# Patient Record
Sex: MALE | Race: OTHER | NOT HISPANIC OR LATINO | URBAN - METROPOLITAN AREA
[De-identification: names, ages, dates, MRNs, and addresses within clinical notes are randomized per-mention and may not be internally consistent; named-entity substitution may affect disease eponyms.]

---

## 2019-02-27 ENCOUNTER — EMERGENCY (EMERGENCY)
Facility: HOSPITAL | Age: 59
LOS: 0 days | Discharge: HOME | End: 2019-02-27
Attending: EMERGENCY MEDICINE | Admitting: EMERGENCY MEDICINE

## 2019-02-27 VITALS
DIASTOLIC BLOOD PRESSURE: 75 MMHG | TEMPERATURE: 97 F | HEART RATE: 76 BPM | RESPIRATION RATE: 18 BRPM | SYSTOLIC BLOOD PRESSURE: 127 MMHG | OXYGEN SATURATION: 98 % | HEIGHT: 68 IN | WEIGHT: 164.91 LBS
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
## 2019-02-27 DIAGNOSIS — S89.92XA UNSPECIFIED INJURY OF LEFT LOWER LEG, INITIAL ENCOUNTER: ICD-10-CM

## 2019-02-27 DIAGNOSIS — M25.562 PAIN IN LEFT KNEE: ICD-10-CM
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
## 2019-02-27 NOTE — ED PROVIDER NOTE - CLINICAL SUMMARY MEDICAL DECISION MAKING FREE TEXT BOX
59 yo male who tripped and fell this am while walking to bathroom in home in dark, landing on left knee. NO gross joint deformity. Xray did not show any acute fx or dislocation. Will refer to ortho as outpatient and place in knee brace

## 2019-02-27 NOTE — ED PROVIDER NOTE - CARE PROVIDER_API CALL
Anjum Thompson (MD)  Orthopaedic Surgery  [address] [place] [address]  Phone: [phone]  Fax: [phone]  Follow Up Time:

## 2019-02-27 NOTE — ED PROVIDER NOTE - ATTENDING CONTRIBUTION TO CARE
59 yo male presents to ER for left knee pain s/o fall PTA. States he was going to bathroom and tripped (as room dark) and fell, landing on his knee. Denies any LOC, no HA, no use of blood thinners, no CP/SOB/LOC/head injury/neck or back pain/abdomen pain/numbness or weakness. Pain to knee worse with ambulation. +tenderness with palpation to the knee over the medial aspect of left knee. No large joint effusion on visual inspection. Neuro-vascular exam in tact. NO gross joint deformity. Will check xray and place in knee immobilizer/brace and refer to ortho

## 2019-02-27 NOTE — ED PROVIDER NOTE - PHYSICAL EXAMINATION
CONSTITUTIONAL: Well-appearing; well-nourished; in no apparent distress.   CARDIOVASCULAR: Normal S1, S2; no murmurs, rubs, or gallops.   RESPIRATORY: Normal chest excursion with respiration; breath sounds clear and equal bilaterally; no wheezes, rhonchi, or rales.  MS: + tenderness to medial aspect of left knee. neg joint effusion. knee stable. no laxity. + pain to medial stress test. left ankle/foot/hip non-tender. N/V intact. ambulate with antalgic gait. no midline tenderness to neck and back.   SKIN: Normal for age and race; warm; dry; good turgor; no apparent lesions or exudate.   NEURO/PSYCH: A & O x 4; grossly unremarkable.

## 2019-02-27 NOTE — ED PROVIDER NOTE - NSFOLLOWUPINSTRUCTIONS_ED_ALL_ED_FT
Knee Sprain  K  nee sprain is a stretch or tear in a knee ligament. Knee ligaments are bands of tissue that connect bones in the knee to each other.    Follow these instructions at home:  If you have a splint or brace:     Wear the splint or brace as told by your doctor. Remove it only as told by your doctor.  Loosen the splint or brace if your toes tingle, get numb, or turn cold and blue.  Keep the splint or brace clean.  If the splint or brace is not waterproof:    Do not let it get wet.  Cover it with a watertight covering when you take a bath or a shower.    If you have a cast:     Do not stick anything inside the cast to scratch your skin.  Check the skin around the cast every day. Tell your doctor about any concerns.  You may put lotion on dry skin around the edges of the cast. Do not put lotion on the skin underneath the cast.  Keep the cast clean.  If the cast is not waterproof:    Do not let it get wet.  Cover it with a watertight covering when you take a bath or a shower.    Managing pain, stiffness, and swelling     Gently move your toes often to avoid stiffness and to lessen swelling.  Raise (elevate) the injured area above the level of your heart while you are sitting or lying down.  Take over-the-counter and prescription medicines only as told by your doctor.  ImageIf directed, put ice on the injured area.    If you have a removable splint or brace, remove it as told by your doctor.  Put ice in a plastic bag.  Place a towel between your skin and the bag or between your cast and the bag.  Leave the ice on for 20 minutes, 2–3 times a day.    General instructions     Do exercises as told by your doctor.  Keep all follow-up visits as told by your doctor. This is important.  Contact a doctor if:  You have pain that gets worse.  The cast, brace, or splint does not fit right.  The cast, brace, or splint gets damaged.  Get help right away if:  You cannot lean on your knee to stand or walk.  You cannot move the injured area.  You knee kody or you have pain after you walk only a few steps.  You have very bad pain, swelling, or numbness below the cast, brace, or splint.  Summary  A knee sprain is a stretch or tear in a band (ligament) that connects your knee bones to each other.  You may need to wear a splint, brace, or cast to help your knee get better.  Contact your doctor if you have very bad pain, swelling, or numbness, or if you cannot walk.  This information is not intended to replace advice given to you by your health care provider. Make sure you discuss any questions you have with your health care provider. Knee Sprain    Knee sprain is a stretch or tear in a knee ligament. Knee ligaments are bands of tissue that connect bones in the knee to each other.    Follow these instructions at home:  If you have a splint or brace:     Wear the splint or brace as told by your doctor. Remove it only as told by your doctor.  Loosen the splint or brace if your toes tingle, get numb, or turn cold and blue.  Keep the splint or brace clean.  If the splint or brace is not waterproof:    Do not let it get wet.  Cover it with a watertight covering when you take a bath or a shower.    If you have a cast:     Do not stick anything inside the cast to scratch your skin.  Check the skin around the cast every day. Tell your doctor about any concerns.  You may put lotion on dry skin around the edges of the cast. Do not put lotion on the skin underneath the cast.  Keep the cast clean.  If the cast is not waterproof:    Do not let it get wet.  Cover it with a watertight covering when you take a bath or a shower.    Managing pain, stiffness, and swelling     Gently move your toes often to avoid stiffness and to lessen swelling.  Raise (elevate) the injured area above the level of your heart while you are sitting or lying down.  Take over-the-counter and prescription medicines only as told by your doctor.  ImageIf directed, put ice on the injured area.    If you have a removable splint or brace, remove it as told by your doctor.  Put ice in a plastic bag.  Place a towel between your skin and the bag or between your cast and the bag.  Leave the ice on for 20 minutes, 2–3 times a day.    General instructions     Do exercises as told by your doctor.  Keep all follow-up visits as told by your doctor. This is important.  Contact a doctor if:  You have pain that gets worse.  The cast, brace, or splint does not fit right.  The cast, brace, or splint gets damaged.  Get help right away if:  You cannot lean on your knee to stand or walk.  You cannot move the injured area.  You knee kody or you have pain after you walk only a few steps.  You have very bad pain, swelling, or numbness below the cast, brace, or splint.  Summary  A knee sprain is a stretch or tear in a band (ligament) that connects your knee bones to each other.  You may need to wear a splint, brace, or cast to help your knee get better.  Contact your doctor if you have very bad pain, swelling, or numbness, or if you cannot walk.  This information is not intended to replace advice given to you by your health care provider. Make sure you discuss any questions you have with your health care provider.

## 2019-02-27 NOTE — ED PROCEDURE NOTE - ATTENDING CONTRIBUTION TO CARE
I was present for and supervised the key/critical aspects of the procedures performed during the care of the patient.--knee brace

## 2019-02-27 NOTE — ED PROVIDER NOTE - OBJECTIVE STATEMENT
57 yo male no sig hx present c/o left knee pain s/p fall at home. reported he was going to bathroom and he tripped and fell on his left knee. pain mostly to medial aspect. pain worsen with walking and palpation. denies bleeding/numbness and weakness to left leg. denies head injury/LOC/neck and back pain. denies pain to other extremities. denies chest pain/abd pain/n/v/d.

## 2021-08-09 ENCOUNTER — EMERGENCY (EMERGENCY)
Facility: HOSPITAL | Age: 61
LOS: 0 days | Discharge: HOME | End: 2021-08-09
Attending: EMERGENCY MEDICINE | Admitting: EMERGENCY MEDICINE
Payer: COMMERCIAL

## 2021-08-09 VITALS — SYSTOLIC BLOOD PRESSURE: 132 MMHG | DIASTOLIC BLOOD PRESSURE: 73 MMHG | HEART RATE: 72 BPM | RESPIRATION RATE: 18 BRPM

## 2021-08-09 VITALS
TEMPERATURE: 99 F | SYSTOLIC BLOOD PRESSURE: 127 MMHG | HEART RATE: 86 BPM | OXYGEN SATURATION: 95 % | RESPIRATION RATE: 18 BRPM | DIASTOLIC BLOOD PRESSURE: 67 MMHG

## 2021-08-09 DIAGNOSIS — M54.2 CERVICALGIA: ICD-10-CM
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
## 2021-08-09 DIAGNOSIS — S09.90XA UNSPECIFIED INJURY OF HEAD, INITIAL ENCOUNTER: ICD-10-CM
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
## 2021-08-09 PROCEDURE — 99285 EMERGENCY DEPT VISIT HI MDM: CPT

## 2021-08-09 PROCEDURE — 71045 X-RAY EXAM CHEST 1 VIEW: CPT | Mod: 26

## 2021-08-09 PROCEDURE — 72125 CT NECK SPINE W/O DYE: CPT | Mod: 26,MA

## 2021-08-09 PROCEDURE — 73030 X-RAY EXAM OF SHOULDER: CPT | Mod: 26,RT

## 2021-08-09 PROCEDURE — 72170 X-RAY EXAM OF PELVIS: CPT | Mod: 26

## 2021-08-09 PROCEDURE — 70450 CT HEAD/BRAIN W/O DYE: CPT | Mod: 26,MA
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
## 2021-08-09 NOTE — ED PROVIDER NOTE - PHYSICAL EXAMINATION
CONSTITUTIONAL: Well-developed; well-nourished; in no acute distress. gcs 15, speaking in full sentences, moving all extremities  SKIN: warm, dry  HEAD: Normocephalic; see above  EYES: PERRL, EOMI, no conjunctival erythema  ENT: No nasal discharge; airway clear, mucous membranes moist  NECK: c collar in place   CARD: +S1, S2 no murmurs, gallops, or rubs. Regular rate and rhythm. radial 2+ b/l, no chest wall tenderness or crepitus  RESP: No wheezes, rales or rhonchi. CTABL  ABD: soft ntnd, no rebound, no guarding, no rigidity  FAST negative  EXT: moves all extremities,  No clubbing, cyanosis or edema.   NEURO: Alert, oriented, grossly unremarkable, cn ii-xii grossly intact, follows commands  PSYCH: Cooperative, appropriate.

## 2021-08-09 NOTE — ED PROVIDER NOTE - PATIENT PORTAL LINK FT
You can access the FollowMyHealth Patient Portal offered by [facility] by registering at the following website: [email or web address]. By joining [employer]’s FollowMyHealth portal, you will also be able to view your health information using other applications (apps) compatible with our system.

## 2021-08-09 NOTE — ED PROVIDER NOTE - NS ED ROS FT
Constitutional:  No fevers or chills.  Eyes:  No visual changes, eye pain, or discharge.  ENT:  No hearing changes. No sore throat.  Neck:  (+) neck pain.  (+) neck stiffness.  Cardiac:  No CP or edema.  Resp:  No cough or SOB. No hemoptysis.   GI:  No nausea, vomiting, diarrhea, or abdominal pain.  :  No dysuria, frequency, or hematuria.  MSK:  No myalgias or joint pain/swelling.  Neuro:  No headache, dizziness, or weakness.  Skin:  No skin rash.
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
## 2021-08-09 NOTE — ED PROVIDER NOTE - NSFOLLOWUPCLINICS_GEN_ALL_ED_FT
[facility]  Trauma Surgery  [address] [place] [address]  Phone: [phone]  Fax:   Follow Up Time: 1-3 Days
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
## 2021-10-28 NOTE — ED PROVIDER NOTE - DOMESTIC TRAVEL HIGH RISK QUESTION
Price (Do Not Change): 0.00
Instructions: This plan will send the code FBSE to the PM system.  DO NOT or CHANGE the price.
Detail Level: Generalized
No
